# Patient Record
Sex: MALE | Race: WHITE | NOT HISPANIC OR LATINO | Employment: FULL TIME | URBAN - METROPOLITAN AREA
[De-identification: names, ages, dates, MRNs, and addresses within clinical notes are randomized per-mention and may not be internally consistent; named-entity substitution may affect disease eponyms.]

---

## 2017-03-24 ENCOUNTER — TRANSCRIBE ORDERS (OUTPATIENT)
Dept: ADMINISTRATIVE | Facility: HOSPITAL | Age: 42
End: 2017-03-24

## 2017-03-24 DIAGNOSIS — G91.9 HYDROCEPHALUS (HCC): Primary | ICD-10-CM

## 2019-03-05 ENCOUNTER — TRANSCRIBE ORDERS (OUTPATIENT)
Dept: ADMINISTRATIVE | Facility: HOSPITAL | Age: 44
End: 2019-03-05

## 2019-03-05 DIAGNOSIS — M25.562 ACUTE PAIN OF LEFT KNEE: Primary | ICD-10-CM

## 2020-07-31 ENCOUNTER — EVALUATION (OUTPATIENT)
Dept: PHYSICAL THERAPY | Facility: CLINIC | Age: 45
End: 2020-07-31
Payer: COMMERCIAL

## 2020-07-31 ENCOUNTER — TRANSCRIBE ORDERS (OUTPATIENT)
Dept: PHYSICAL THERAPY | Facility: CLINIC | Age: 45
End: 2020-07-31

## 2020-07-31 DIAGNOSIS — R42 DIZZINESS: Primary | ICD-10-CM

## 2020-07-31 PROCEDURE — 97162 PT EVAL MOD COMPLEX 30 MIN: CPT | Performed by: PHYSICAL THERAPIST

## 2020-07-31 RX ORDER — DIPHENOXYLATE HYDROCHLORIDE AND ATROPINE SULFATE 2.5; .025 MG/1; MG/1
1 TABLET ORAL DAILY
COMMUNITY

## 2020-07-31 RX ORDER — PREDNISONE 20 MG/1
TABLET ORAL DAILY
COMMUNITY
End: 2022-04-25 | Stop reason: HOSPADM

## 2020-07-31 NOTE — PROGRESS NOTES
PT Evaluation     Today's date: 2020  Patient name: Manual Goodell  : 1975  MRN: [id number]  Referring provider: No ref  provider found  Dx:   Encounter Diagnosis     ICD-10-CM    1  Dizziness R42                   Assessment  Assessment details: Pt is a 54y o  year old male coming to outpatient PT with a diagnosis of vestibular neuritis onset 20  Pt presents with decreased balance, oculomotor deficits, and overall decreased functional mobility  Pt would benefit from skilled PT services in order to address these deficits and reach maximum level of function  Thank you kindly for the referral!  Impairments: activity intolerance, impaired balance and lacks appropriate home exercise program  Other impairment: oculomotor deficits  Barriers to therapy: High deductible  Understanding of Dx/Px/POC: good   Prognosis: good    Goals  STG's ( 3-4 weeks)  1  Pt will be independent in HEP  2  Improve balance demonstrated by improved score on CT-SIB testing  LTG's ( 6- 8 weeks)  1  Improve FOTO score by 8-10 points  2  Pt will have less dizziness with yard work  3  Pt will have improved tolerance for work activities  4  Pt will return to light recreational activities    Plan  Patient would benefit from: PT eval and skilled physical therapy  Planned therapy interventions: manual therapy, balance, neuromuscular re-education, strengthening, stretching, therapeutic activities, therapeutic exercise, functional ROM exercises, flexibility and home exercise program  Frequency: 1x week  Duration in weeks: 6  Plan of Care beginning date: 2020  Plan of Care expiration date: 2020  Treatment plan discussed with: patient        Subjective Evaluation    History of Present Illness  Date of onset: 2020  Mechanism of injury: Pt reports increased dizziness 20  Pt was sitting at his desk working on his desk at the computer and all of the sudden, the room started spinning   Pt broke out into a cold sweat, nausea, he took some deep breaths and it gradually resolved  Pt continued working the rest of the day  Pt felt a little "off"  Pt rested that evening  Pt got up to grill some hamburgers, but he had to hold onto the counter tops because he felt off balance  Pt went to Urgent Care the next morning ( Saturday)  Pt rested the rest of the day, and went upstairs at night and had extreme nausea and vomited about 4 times ( about every 1 5 hours)  Pt has shunt dependent hydrocephalus, where he could get nausea and vomiting, but usually has pressure  Pt was diagnosed with vestibular neuritis, a viral infection with the vestibular nerve  Pt was placed on Prednisone  Pt has been currently working from home and it has been challenging and feels cloudy and has difficulty focusing on 2 computer monitors  Pt has increased difficulty with concentrating and thinking, looking at 2 computer monitors, to move quickly when standing up to get out of a chair, performing yard work, and running/ exercise  Pt feels like he needs to move slowly, feels fatigued, and feels " wavy"      Work: ; office work managing projects, computer  Hobbies: running, ride bike  Gait: no abnormalities  Pain  At best pain ratin  At worst pain rating: 3  Location: headache  Quality: dull ache    Treatments  Previous treatment: medication  Patient Goals  Patient goal: to return to normal functioning; to build up vestibular nerve function        Objective     Neurological Testing     Sensation   Cervical/Thoracic   Left   Intact: light touch    Right   Intact: light touch    Reflexes   Left   Biceps (C5/C6): normal (2+)  Brachioradialis (C6): normal (2+)  Triceps (C7): normal (2+)    Right   Biceps (C5/C6): normal (2+)  Brachioradialis (C6): normal (2+)  Triceps (C7): normal (2+)    Active Range of Motion   Cervical/Thoracic Spine       Cervical    Flexion:  WFL  Extension:  WFL  Left lateral flexion:  WFL  Right lateral flexion:  WFL  Left rotation: WFL  Right rotation:  [facility]    Additional Active Range of Motion Details  Vestibular Examination:    Primary Complaints: nausea, fatigue, vomitting    Exacerbating Factors: unknown    Relieving Factors: rest    Modified CT SIB testing:  EO on firm surface:    99 sway index  EC on firm surface: 1 30  sway index  EO on foam surface:  70  sway index  EC on foam surface: 2 31  sway index    Spontaneous nystagmus room light: negative  Gaze holding nystagmus room light: negative  Skew deviation room light: negative  Smooth pursuits:  vertical:  30 seconds, fatigue, saccadic   Horizontal: 30 seconds, fatigue, saccadic  Horizontal head thrust test: negative  VOR cancellation: 30 seconds; mild dizziness  Near point convergence: 6 5 cm  ( normal 4- 6 cm)  Good convergence  Oculomotor mobility: good  Sharp Stu test: negative  Vertebral Artery Test: negative  Dennis Hallpike test: negative  Horizontal Roll test: NT    Increased tone with mild TTP B upper trap/levator scap            Dx: dizziness/ vestibular neuritis  EPOC: 9/11/20  CO-MORBIDITIES: hydrocephalus with shunt  PERSONAL FACTORS: drives 1 hour to work  Precautions: none      Manuals             B upper trap MFR                                                    Neuro Re-Ed             Walking with head turns horiz             Walking with head turns vert             VOR x2  horiz             VOR x2 verti             VOR x1 horiz pattern bg             Treadmill: look close, look far             Sidestepping on foam             Tandem walking on foam             4 square hurdles w/ foam             HT on foam EC             Sidestepping with cone taps                                                                              Ther Activity                                       Gait Training                                       Modalities

## 2020-07-31 NOTE — LETTER
2020    [address] [place] [id number]    Patient: Navdeep Terrazas   YOB: 1975   Date of Visit: 2020     Encounter Diagnosis     ICD-10-CM    1  Ceci Bacon        Dear Dr Soumya Bishop:    Thank you for your recent referral of Navdeep Terrazas  Please review the attached evaluation summary from [facility] recent visit  Please verify that you agree with the plan of care by signing the attached order  If you have any questions or concerns, please do not hesitate to call  I sincerely appreciate the opportunity to share in the care of one of your patients and hope to have another opportunity to work with you in the near future  Sincerely,    Zelda Gooden, PT      Referring Provider:      I certify that I have read the below Plan of Care and certify the need for these services furnished under this plan of treatment while under my care  [address] [place] [address] Drive: [phone]          PT Evaluation     Today's date: 2020  Patient name: Navdeep Terrazas  : 1975  MRN: [id number]  Referring provider: No ref  provider found  Dx:   Encounter Diagnosis     ICD-10-CM    1  Dizziness R42                   Assessment  Assessment details: Pt is a 54y o  year old male coming to outpatient PT with a diagnosis of vestibular neuritis onset 20  Pt presents with decreased balance, oculomotor deficits, and overall decreased functional mobility  Pt would benefit from skilled PT services in order to address these deficits and reach maximum level of function   Thank you kindly for the referral!  Impairments: activity intolerance, impaired balance and lacks appropriate home exercise program  Other impairment: oculomotor deficits  Barriers to therapy: High deductible  Understanding of Dx/Px/POC: good   Prognosis: good    Goals  STG's ( 3-4 weeks)  1  Pt will be independent in HEP  2  Improve balance demonstrated by improved score on CT-SIB testing  LTG's ( 6- 8 weeks)  1  Improve FOTO score by 8-10 points  2  Pt will have less dizziness with yard work  3  Pt will have improved tolerance for work activities  4  Pt will return to light recreational activities    Plan  Patient would benefit from: PT eval and skilled physical therapy  Planned therapy interventions: manual therapy, balance, neuromuscular re-education, strengthening, stretching, therapeutic activities, therapeutic exercise, functional ROM exercises, flexibility and home exercise program  Frequency: 1x week  Duration in weeks: 6  Plan of Care beginning date: 7/31/2020  Plan of Care expiration date: 9/11/2020  Treatment plan discussed with: patient        Subjective Evaluation    History of Present Illness  Date of onset: 7/24/2020  Mechanism of injury: Pt reports increased dizziness 7/24/20  Pt was sitting at his desk working on his desk at the computer and all of the sudden, the room started spinning  Pt broke out into a cold sweat, nausea, he took some deep breaths and it gradually resolved  Pt continued working the rest of the day  Pt felt a little "off"  Pt rested that evening  Pt got up to grill some hamburgers, but he had to hold onto the counter tops because he felt off balance  Pt went to Urgent Care the next morning ( Saturday)  Pt rested the rest of the day, and went upstairs at night and had extreme nausea and vomited about 4 times ( about every 1 5 hours)  Pt has shunt dependent hydrocephalus, where he could get nausea and vomiting, but usually has pressure  Pt was diagnosed with vestibular neuritis, a viral infection with the vestibular nerve  Pt was placed on Prednisone  Pt has been currently working from home and it has been challenging and feels cloudy and has difficulty focusing on 2 computer monitors    Pt has increased difficulty with concentrating and thinking, looking at 2 computer monitors, to move quickly when standing up to get out of a chair, performing yard work, and running/ exercise  Pt feels like he needs to move slowly, feels fatigued, and feels " wavy"      Work: ; office work managing projects, computer  Hobbies: running, ride bike  Gait: no abnormalities  Pain  At best pain ratin  At worst pain rating: 3  Location: headache  Quality: dull ache    Treatments  Previous treatment: medication  Patient Goals  Patient goal: to return to normal functioning; to build up vestibular nerve function        Objective     Neurological Testing     Sensation   Cervical/Thoracic   Left   Intact: light touch    Right   Intact: light touch    Reflexes   Left   Biceps (C5/C6): normal (2+)  Brachioradialis (C6): normal (2+)  Triceps (C7): normal (2+)    Right   Biceps (C5/C6): normal (2+)  Brachioradialis (C6): normal (2+)  Triceps (C7): normal (2+)    Active Range of Motion   Cervical/Thoracic Spine       Cervical    Flexion:  WFL  Extension:  WFL  Left lateral flexion:  WFL  Right lateral flexion:  WFL  Left rotation:  WFL  Right rotation:  [facility]    Additional Active Range of Motion Details  Vestibular Examination:    Primary Complaints: nausea, fatigue, vomitting    Exacerbating Factors: unknown    Relieving Factors: rest    Modified CT SIB testing:  EO on firm surface:    99 sway index  EC on firm surface: 1 30  sway index  EO on foam surface:  70  sway index  EC on foam surface: 2 31  sway index    Spontaneous nystagmus room light: negative  Gaze holding nystagmus room light: negative  Skew deviation room light: negative  Smooth pursuits:  vertical:  30 seconds, fatigue, saccadic   Horizontal: 30 seconds, fatigue, saccadic  Horizontal head thrust test: negative  VOR cancellation: 30 seconds; mild dizziness  Near point convergence: 6 5 cm  ( normal 4- 6 cm)  Good convergence  Oculomotor mobility: good  Sharp Stu test: negative  Vertebral Artery Test: negative  Atomic Moguls test: negative  Horizontal Roll test: NT    Increased tone with mild TTP B upper trap/levator scap            Dx: dizziness/ vestibular neuritis  EPOC: 9/11/20  CO-MORBIDITIES: hydrocephalus with shunt  PERSONAL FACTORS: drives 1 hour to work  Precautions: none      Manuals             B upper trap MFR                                                    Neuro Re-Ed             Walking with head turns horiz             Walking with head turns vert             VOR x2  horiz             VOR x2 verti             VOR x1 horiz pattern bg             Treadmill: look close, look far             Sidestepping on foam             Tandem walking on foam             4 square hurdles w/ foam             HT on foam EC             Sidestepping with cone taps                                                                              Ther Activity                                       Gait Training                                       Modalities

## 2020-08-04 ENCOUNTER — APPOINTMENT (OUTPATIENT)
Dept: PHYSICAL THERAPY | Facility: CLINIC | Age: 45
End: 2020-08-04
Payer: COMMERCIAL

## 2020-08-05 ENCOUNTER — APPOINTMENT (OUTPATIENT)
Dept: PHYSICAL THERAPY | Facility: CLINIC | Age: 45
End: 2020-08-05
Payer: COMMERCIAL

## 2020-08-11 ENCOUNTER — OFFICE VISIT (OUTPATIENT)
Dept: PHYSICAL THERAPY | Facility: CLINIC | Age: 45
End: 2020-08-11
Payer: COMMERCIAL

## 2020-08-11 DIAGNOSIS — R42 DIZZINESS: Primary | ICD-10-CM

## 2020-08-11 PROCEDURE — 97112 NEUROMUSCULAR REEDUCATION: CPT | Performed by: PHYSICAL THERAPIST

## 2020-08-11 NOTE — PROGRESS NOTES
Daily Note     Today's date: 2020  Patient name: Marky Santos  : 1975  MRN: [id number]  Referring provider: Arlen Kayser, PA-C  Dx:   Encounter Diagnosis     ICD-10-CM    1  Dizziness  R42                   Subjective: Pt had a relapse last week and he was not able to get out of bed  Pt went for a slow bike ride on  for about 15 minutes  Pt started feeling worse and it progressively worsened  Pt was not able to get out of bed  Pt threw up at home  Pt reports he is starting to recover  Pt is compliant in HEP and the exercises help him feel better  Pt fatigues with work activities and general activities at home  Objective: See treatment diary below      Assessment: Tolerated treatment well  Patient exhibited good technique with therapeutic exercises  Pt was given an updated HEP and discussed exercises to be performed this week and exercise progression  Plan: Continue per plan of care        Dx: dizziness/ vestibular neuritis  EPOC: 20  CO-MORBIDITIES: hydrocephalus with shunt  PERSONAL FACTORS: drives 1 hour to work  Precautions: none      Manuals             B upper trap MFR                                                    Neuro Re-Ed             Walking with head turns horiz x2 laps             Walking with head turns vert x2 laps            VOR x2  horiz 15"x2            VOR x2 verti 15"x2            VOR x1 H & V checkerboard bg 15"x2 ea            Treadmill: look close, look far             Sidestepping on foam 2 laps            Tandem walking on foam 2 laps FW/ BW            4 square hurdles w/ foam x2 ea            HT on foam EC instruct            Sidestepping with cone taps x1 lap w/& w/o foam            Throwing ball to self x2 laps            Diagonal ball pass over shoulder 10 airex B                                                   Ther Activity                                       Gait Training                                       Modalities

## 2020-08-12 ENCOUNTER — APPOINTMENT (OUTPATIENT)
Dept: PHYSICAL THERAPY | Facility: CLINIC | Age: 45
End: 2020-08-12
Payer: COMMERCIAL

## 2020-08-18 ENCOUNTER — OFFICE VISIT (OUTPATIENT)
Dept: PHYSICAL THERAPY | Facility: CLINIC | Age: 45
End: 2020-08-18
Payer: COMMERCIAL

## 2020-08-18 DIAGNOSIS — R42 DIZZINESS: Primary | ICD-10-CM

## 2020-08-18 PROCEDURE — 97112 NEUROMUSCULAR REEDUCATION: CPT | Performed by: PHYSICAL THERAPIST

## 2020-08-18 NOTE — PROGRESS NOTES
Daily Note     Today's date: 2020  Patient name: Manual Goodell  : 1975  MRN: [id number]  Referring provider: Deon Wilson PA-C  Dx:   Encounter Diagnosis     ICD-10-CM    1  Dizziness  R42                   Subjective: Pt felt some fatigue after last visit, but it wasn't too bad  Pt had a good day yesterday while working from home, but felt very tired by 4 pm and it took until 9 pm to feel better  Pt is compliant in HEP and is going for about a 10 min walk every night  Objective: See treatment diary below      Assessment: Tolerated treatment well  Patient tolerated increased oculomotor ex  Spent time discussing how to progress oculomotor ex at home  Plan: Continue per plan of care        Dx: dizziness/ vestibular neuritis  EPOC: 20  CO-MORBIDITIES: hydrocephalus with shunt  PERSONAL FACTORS: drives 1 hour to work  Precautions: none      Manuals            B upper trap MFR                                                    Neuro Re-Ed             Walking with head turns horiz x2 laps  x2 laps diag           Walking with head turns vert x2 laps            VOR x2  horiz 15"x2 30"x1           VOR x2 verti 15"x2 30"x1           VOR x1 H & V checkerboard bg 15"x2 ea 30"x2 ea           Treadmill: look close, look far  6'           Sidestepping on foam w/ high hurdles 2 laps 2 laps           Tandem walking on foam w/ high hurdles 2 laps FW/ BW 2 laps           4 square hurdles w/ foam x2 ea            HT on foam EC & bosu instruct 30" x1 H & V ea           Sidestepping with cone taps x1 lap w/& w/o foam            Throwing ball to self x2 laps            Diagonal ball pass over shoulder 10 airex B            Throw ball w/ 180* turn  x4           Throw ball w/360* turn  x3           Walking w/ VOR x2 H & V  1/2 lap ea           Optokinetic training video  1' sit           Walk with optokinectic training video  x1 large lap                        Gait Training Modalities

## 2020-08-19 ENCOUNTER — APPOINTMENT (OUTPATIENT)
Dept: PHYSICAL THERAPY | Facility: CLINIC | Age: 45
End: 2020-08-19
Payer: COMMERCIAL

## 2020-08-25 ENCOUNTER — OFFICE VISIT (OUTPATIENT)
Dept: PHYSICAL THERAPY | Facility: CLINIC | Age: 45
End: 2020-08-25
Payer: COMMERCIAL

## 2020-08-25 DIAGNOSIS — R42 DIZZINESS: Primary | ICD-10-CM

## 2020-08-25 PROCEDURE — 97110 THERAPEUTIC EXERCISES: CPT | Performed by: PHYSICAL THERAPIST

## 2020-08-25 PROCEDURE — 97112 NEUROMUSCULAR REEDUCATION: CPT | Performed by: PHYSICAL THERAPIST

## 2020-08-25 NOTE — PROGRESS NOTES
Daily Note     Today's date: 2020  Patient name: Cheryle Gaw  : 1975  MRN: [id number]  Referring provider: Varun Stern PA-C  Dx:   Encounter Diagnosis     ICD-10-CM    1  Dizziness  R42                   Subjective: Pt had mild fatigue after last visit  Pt drove to work for 50 minutes into the office and felt good in the morning, but was very fatigued when driving home at the end of the day  Pt took a nap from 5:45 pm-7 pm  Pt has returned to working in the office some days, but is now getting a ride with a neighbor  Objective: See treatment diary below      Assessment: Tolerated treatment well  Patient was challenged by foam/EC ex  Pt is complaint in HEP      Plan: Continue per plan of care   DEE DEE ADAME     Dx: dizziness/ vestibular neuritis  EPOC: 20  CO-MORBIDITIES: hydrocephalus with shunt  PERSONAL FACTORS: drives 1 hour to work  Precautions: none      Manuals           There Ex             HEP review   2'          elliptial   6'                       Neuro Re-Ed             Walking with head turns horiz x2 laps  x2 laps diag x1 lg lap; VOR x1          Walking with head turns vert x2 laps  x1 lg lap VOR x1          VOR x2  horiz 15"x2 30"x1 30"x1 CB          VOR x2 verti 15"x2 30"x1 30"x1 CB          VOR x1 H & V checkerboard bg 15"x2 ea 30"x2 ea           Treadmill: look close, look far  6'           Sidestepping on foam w/ high hurdles 2 laps 2 laps           Tandem walking on foam w/ high hurdles 2 laps FW/ BW 2 laps           4 square hurdles w/ foam x2 ea            HT on foam EC & bosu instruct 30" x1 H & V ea           Sidestepping with cone taps x1 lap w/& w/o foam            Throwing ball to self x2 laps  tandem x2 laps          Diagonal ball pass over shoulder 10 airex B            Throw ball w/ 180* turn  x4 Arc hold ball x10          Throw ball w/360* turn  x3           Walking w/ VOR x2 H & V  1/2 lap ea           Optokinetic training video  1' sit Walk with optokinectic training video  x1 large lap                        Tandem stance EO/EC on airex   30" ea          Walking w/ diag HT   x1 large lap          Tandem stance airex VOR x1 H EC   30"x1          Tandem stance on airex ballx20 throw to therapist

## 2020-08-26 ENCOUNTER — APPOINTMENT (OUTPATIENT)
Dept: PHYSICAL THERAPY | Facility: CLINIC | Age: 45
End: 2020-08-26
Payer: COMMERCIAL

## 2020-09-02 ENCOUNTER — OFFICE VISIT (OUTPATIENT)
Dept: PHYSICAL THERAPY | Facility: CLINIC | Age: 45
End: 2020-09-02
Payer: COMMERCIAL

## 2020-09-02 ENCOUNTER — TRANSCRIBE ORDERS (OUTPATIENT)
Dept: PHYSICAL THERAPY | Facility: CLINIC | Age: 45
End: 2020-09-02

## 2020-09-02 DIAGNOSIS — R42 DIZZINESS: Primary | ICD-10-CM

## 2020-09-02 PROCEDURE — 97164 PT RE-EVAL EST PLAN CARE: CPT | Performed by: PHYSICAL THERAPIST

## 2020-09-02 PROCEDURE — 97112 NEUROMUSCULAR REEDUCATION: CPT | Performed by: PHYSICAL THERAPIST

## 2020-09-02 NOTE — LETTER
2020    [address] [place] [address]    Patient: Alber Castro   YOB: 1975   Date of Visit: 2020     Encounter Diagnosis     ICD-10-CM    1  Valerie Gomez        Dear Dr Austin Turner:    Thank you for your recent referral of Alber Castro  Please review the attached evaluation summary from [facility] recent visit  Please verify that you agree with the plan of care by signing the attached order  If you have any questions or concerns, please do not hesitate to call  I sincerely appreciate the opportunity to share in the care of one of your patients and hope to have another opportunity to work with you in the near future  Sincerely,    Shana Curiel, PT      Referring Provider:      I certify that I have read the below Plan of Care and certify the need for these services furnished under this plan of treatment while under my care  [address]  [place]: [phone]          PT Re-Evaluation     Today's date: 2020  Patient name: Alber Castro  : 1975  MRN: [id number]  Referring provider: Sonam Garibay PA-C  Dx:   Encounter Diagnosis     ICD-10-CM    1  Dizziness  R42                   Assessment  Assessment details: Since starting skilled PT, oculomotor deficits are improving, balance improving with gradual functional progress  Recommend pt continue skilled PT at a decreased frequency of 1 time every 2-3 weeks to monitor pt progress  Pt will progress to a self directed program   Impairments: activity intolerance  Other impairment: oculomotor deficits  Barriers to therapy: High deductible  Understanding of Dx/Px/POC: good   Prognosis: good    Goals  STG's ( 3-4 weeks)  1  Pt will be independent in HEP-met  2   Improve balance demonstrated by improved score on CT-SIB testing-partial met  LTG's ( 6- 8 weeks)  1  Improve FOTO score by 8-10 points-met  2  Pt will have less dizziness with yard work-partial met  3  Pt will have improved tolerance for work activities-met  4  Pt will return to light recreational activities-met    Plan  Patient would benefit from: skilled physical therapy  Planned therapy interventions: manual therapy, balance, neuromuscular re-education, strengthening, stretching, therapeutic activities, therapeutic exercise, functional ROM exercises, flexibility and home exercise program  Frequency: 1 time every 2-3 weeks  Duration in weeks: 8  Plan of Care beginning date: 8/31/2020  Plan of Care expiration date: 10/26/2020  Treatment plan discussed with: patient        Subjective Evaluation    History of Present Illness  Date of onset: 7/24/2020  Mechanism of injury: Pt reports increased dizziness 7/24/20  Pt was sitting at his desk working on his desk at the computer and all of the sudden, the room started spinning  Pt broke out into a cold sweat, nausea, he took some deep breaths and it gradually resolved  Pt continued working the rest of the day  Pt felt a little "off"  Pt rested that evening  Pt got up to grill some hamburgers, but he had to hold onto the counter tops because he felt off balance  Pt went to Urgent Care the next morning ( Saturday)  Pt rested the rest of the day, and went upstairs at night and had extreme nausea and vomited about 4 times ( about every 1 5 hours)  Pt has shunt dependent hydrocephalus, where he could get nausea and vomiting, but usually has pressure  Pt was diagnosed with vestibular neuritis, a viral infection with the vestibular nerve  Pt was placed on Prednisone  Pt has been currently working from home and it has been challenging and feels cloudy and has difficulty focusing on 2 computer monitors    Pt has increased difficulty with concentrating and thinking, looking at 2 computer monitors, to move quickly when standing up to get out of a chair, performing yard work, and running/ exercise  Pt feels like he needs to move slowly, feels fatigued, and feels " wavy"  Pt reports he did more activity on   Pt did yard work, but took a lot of breaks and felt fatigued/ exhausted at night  Yesterday, he drove 1 5 hours to [facility] to and from work  He felt pretty good after working and driving home  Pt still has a feeling of " not totally right"  Pt is able to move better in general and can move his head with less consequences  Pt has been taking walks outside without exacerbation of symptoms  Pt reports a 90% improvement since starting skilled PT  Pt reports HA symptoms come and go  Pt gets a headache concentrating looking at a computer screen all day  Work: ; office work managing projects, computer  Hobbies: running, ride bike  Gait: no abnormalities  Pain  At best pain ratin  At worst pain rating: 3  Location: headache  Quality: dull ache    Treatments  Previous treatment: medication  Patient Goals  Patient goal: to return to normal functioning; to build up vestibular nerve function        Objective     Neurological Testing     Sensation   Cervical/Thoracic   Left   Intact: light touch    Right   Intact: light touch    Reflexes   Left   Biceps (C5/C6): normal (2+)  Brachioradialis (C6): normal (2+)  Triceps (C7): normal (2+)    Right   Biceps (C5/C6): normal (2+)  Brachioradialis (C6): normal (2+)  Triceps (C7): normal (2+)    Active Range of Motion   Cervical/Thoracic Spine       Cervical    Flexion:  WFL  Extension:  WFL  Left lateral flexion:  WFL  Right lateral flexion:  WFL  Left rotation:  WFL  Right rotation:  [facility]    Additional Active Range of Motion Details  Vestibular Examination:    Primary Complaints: nausea, fatigue, vomitting    Exacerbating Factors: unknown    Relieving Factors: rest    I E: Modified CT SIB testin/2/20 Mod CT SIB testing  EO on firm surface:    99 sway index                        91  Sway index EC on firm surface: 1 30  sway index                       1 58 sway index  EO on foam surface:  70  sway index                         88 sway index  EC on foam surface: 2 31  sway index                      2 24 sway index    Spontaneous nystagmus room light: negative  Gaze holding nystagmus room light: negative  Skew deviation room light: negative  Smooth pursuits:  vertical:  30 seconds, fatigue, saccadic   Horizontal: 30 seconds, fatigue, saccadic;   9/2/20: horizontal: 30 seconds; no fatigue; no saccadic mvt good tracking               Vertical: 30 seconds; no fatigue; no saccadic eye mvt; good tracking  Horizontal head thrust test: negative  VOR cancellation: 30 seconds; mild dizziness; 30 seconds  No dizziness  Near point convergence: 6 5 cm; 9/2/20: 6 cm  ( normal 4- 6 cm)  Good convergence  Oculomotor mobility: good  Sharp Stu test: negative  Vertebral Artery Test: negative  [place] Hallpike test: negative  Horizontal Roll test: NT    Increased tone with mild TTP B upper trap/levator scap          Dx: dizziness/ vestibular neuritis  EPOC: 9/11/20  CO-MORBIDITIES: hydrocephalus with shunt  PERSONAL FACTORS: drives 1 hour to work  Precautions: none      Manuals 8/11 8/18 8/25 9/2         There Ex             HEP review   2'          elliptial   6'          Progress note    28'         Neuro Re-Ed             Walking with head turns horiz x2 laps  x2 laps diag x1 lg lap; VOR x1          Walking with head turns vert x2 laps  x1 lg lap VOR x1          VOR x2  horiz 15"x2 30"x1 30"x1 CB          VOR x2 verti 15"x2 30"x1 30"x1 CB          VOR x1 H & V checkerboard bg 15"x2 ea 30"x2 ea           Treadmill: look close, look far  6'           Sidestepping on foam w/ high hurdles 2 laps 2 laps           Tandem walking on foam w/ high hurdles 2 laps FW/ BW 2 laps           4 square hurdles w/ foam x2 ea            HT on foam EC & bosu instruct 30" x1 H & V ea           Sidestepping with cone taps x1 lap w/& w/o foam            Throwing ball to self x2 laps  tandem x2 laps          Diagonal ball pass over shoulder 10 airex B            Throw ball w/ 180* turn  x4 Arc hold ball x10          Throw ball w/360* turn  x3           Walking w/ VOR x2 H & V  1/2 lap ea           Optokinetic training video  1' sit           Walk with optokinectic training video  x1 large lap                        Tandem stance EO/EC on airex   30" ea 30"x2 ea         Walking w/ diag HT   x1 large lap          Tandem stance airex VOR x1 H EC   30"x1 FT EC H& V 30"x2         Tandem stance on airex ballx20 throw to therapist

## 2020-09-02 NOTE — PROGRESS NOTES
PT Re-Evaluation     Today's date: 2020  Patient name: Carissa Husain  : 1975  MRN: [id number]  Referring provider: Bucky Leblanc PA-C  Dx:   Encounter Diagnosis     ICD-10-CM    1  Dizziness  R42                   Assessment  Assessment details: Since starting skilled PT, oculomotor deficits are improving, balance improving with gradual functional progress  Recommend pt continue skilled PT at a decreased frequency of 1 time every 2-3 weeks to monitor pt progress  Pt will progress to a self directed program   Impairments: activity intolerance  Other impairment: oculomotor deficits  Barriers to therapy: High deductible  Understanding of Dx/Px/POC: good   Prognosis: good    Goals  STG's ( 3-4 weeks)  1  Pt will be independent in HEP-met  2  Improve balance demonstrated by improved score on CT-SIB testing-partial met  LTG's ( 6- 8 weeks)  1  Improve FOTO score by 8-10 points-met  2  Pt will have less dizziness with yard work-partial met  3  Pt will have improved tolerance for work activities-met  4  Pt will return to light recreational activities-met    Plan  Patient would benefit from: skilled physical therapy  Planned therapy interventions: manual therapy, balance, neuromuscular re-education, strengthening, stretching, therapeutic activities, therapeutic exercise, functional ROM exercises, flexibility and home exercise program  Frequency: 1 time every 2-3 weeks  Duration in weeks: 8  Plan of Care beginning date: 2020  Plan of Care expiration date: 10/26/2020  Treatment plan discussed with: patient        Subjective Evaluation    History of Present Illness  Date of onset: 2020  Mechanism of injury: Pt reports increased dizziness 20  Pt was sitting at his desk working on his desk at the computer and all of the sudden, the room started spinning  Pt broke out into a cold sweat, nausea, he took some deep breaths and it gradually resolved  Pt continued working the rest of the day   Pt felt a little "off"  Pt rested that evening  Pt got up to grill some hamburgers, but he had to hold onto the counter tops because he felt off balance  Pt went to Urgent Care the next morning ( Saturday)  Pt rested the rest of the day, and went upstairs at night and had extreme nausea and vomited about 4 times ( about every 1 5 hours)  Pt has shunt dependent hydrocephalus, where he could get nausea and vomiting, but usually has pressure  Pt was diagnosed with vestibular neuritis, a viral infection with the vestibular nerve  Pt was placed on Prednisone  Pt has been currently working from home and it has been challenging and feels cloudy and has difficulty focusing on 2 computer monitors  Pt has increased difficulty with concentrating and thinking, looking at 2 computer monitors, to move quickly when standing up to get out of a chair, performing yard work, and running/ exercise  Pt feels like he needs to move slowly, feels fatigued, and feels " wavy"  Pt reports he did more activity on   Pt did yard work, but took a lot of breaks and felt fatigued/ exhausted at night  Yesterday, he drove 1 5 hours to [facility] to and from work  He felt pretty good after working and driving home  Pt still has a feeling of " not totally right"  Pt is able to move better in general and can move his head with less consequences  Pt has been taking walks outside without exacerbation of symptoms  Pt reports a 90% improvement since starting skilled PT  Pt reports HA symptoms come and go  Pt gets a headache concentrating looking at a computer screen all day    Work: ; office work managing projects, computer  Hobbies: running, ride bike  Gait: no abnormalities  Pain  At best pain ratin  At worst pain rating: 3  Location: headache  Quality: dull ache    Treatments  Previous treatment: medication  Patient Goals  Patient goal: to return to normal functioning; to build up vestibular nerve function        Objective     Neurological Testing     Sensation   Cervical/Thoracic   Left   Intact: light touch    Right   Intact: light touch    Reflexes   Left   Biceps (C5/C6): normal (2+)  Brachioradialis (C6): normal (2+)  Triceps (C7): normal (2+)    Right   Biceps (C5/C6): normal (2+)  Brachioradialis (C6): normal (2+)  Triceps (C7): normal (2+)    Active Range of Motion   Cervical/Thoracic Spine       Cervical    Flexion:  WFL  Extension:  WFL  Left lateral flexion:  WFL  Right lateral flexion:  WFL  Left rotation:  WFL  Right rotation:  [facility]    Additional Active Range of Motion Details  Vestibular Examination:    Primary Complaints: nausea, fatigue, vomitting    Exacerbating Factors: unknown    Relieving Factors: rest    I E: Modified CT SIB testin/2/20 Mod CT SIB testing  EO on firm surface:    99 sway index                        91  Sway index                                EC on firm surface: 1 30  sway index                       1 58 sway index  EO on foam surface:  70  sway index                         88 sway index  EC on foam surface: 2 31  sway index                      2 24 sway index    Spontaneous nystagmus room light: negative  Gaze holding nystagmus room light: negative  Skew deviation room light: negative  Smooth pursuits:  vertical:  30 seconds, fatigue, saccadic   Horizontal: 30 seconds, fatigue, saccadic;   20: horizontal: 30 seconds; no fatigue; no saccadic mvt good tracking               Vertical: 30 seconds; no fatigue; no saccadic eye mvt; good tracking  Horizontal head thrust test: negative  VOR cancellation: 30 seconds; mild dizziness; 30 seconds  No dizziness  Near point convergence: 6 5 cm; 20: 6 cm  ( normal 4- 6 cm)  Good convergence  Oculomotor mobility: good  Sharp Stu test: negative  Vertebral Artery Test: negative  [place] Hallpike test: negative  Horizontal Roll test: NT    Increased tone with mild TTP B upper trap/levator scap          Dx: dizziness/ vestibular neuritis  EPOC: 9/11/20  CO-MORBIDITIES: hydrocephalus with shunt  PERSONAL FACTORS: drives 1 hour to work  Precautions: none      Manuals 8/11 8/18 8/25 9/2         There Ex             HEP review   2'          elliptial   6'          Progress note    28'         Neuro Re-Ed             Walking with head turns horiz x2 laps  x2 laps diag x1 lg lap; VOR x1          Walking with head turns vert x2 laps  x1 lg lap VOR x1          VOR x2  horiz 15"x2 30"x1 30"x1 CB          VOR x2 verti 15"x2 30"x1 30"x1 CB          VOR x1 H & V checkerboard bg 15"x2 ea 30"x2 ea           Treadmill: look close, look far  6'           Sidestepping on foam w/ high hurdles 2 laps 2 laps           Tandem walking on foam w/ high hurdles 2 laps FW/ BW 2 laps           4 square hurdles w/ foam x2 ea            HT on foam EC & bosu instruct 30" x1 H & V ea           Sidestepping with cone taps x1 lap w/& w/o foam            Throwing ball to self x2 laps  tandem x2 laps          Diagonal ball pass over shoulder 10 airex B            Throw ball w/ 180* turn  x4 Arc hold ball x10          Throw ball w/360* turn  x3           Walking w/ VOR x2 H & V  1/2 lap ea           Optokinetic training video  1' sit           Walk with optokinectic training video  x1 large lap                        Tandem stance EO/EC on airex   30" ea 30"x2 ea         Walking w/ diag HT   x1 large lap          Tandem stance airex VOR x1 H EC   30"x1 FT EC H& V 30"x2         Tandem stance on airex ballx20 throw to therapist

## 2020-09-23 ENCOUNTER — OFFICE VISIT (OUTPATIENT)
Dept: PHYSICAL THERAPY | Facility: CLINIC | Age: 45
End: 2020-09-23
Payer: COMMERCIAL

## 2020-09-23 DIAGNOSIS — R42 DIZZINESS: Primary | ICD-10-CM

## 2020-09-23 PROCEDURE — 97110 THERAPEUTIC EXERCISES: CPT | Performed by: PHYSICAL THERAPIST

## 2020-09-23 NOTE — PROGRESS NOTES
Daily Note   Addendum 11/10/20: Pt is doing well with functional activities at home  Pt is able to run 3 miles, ride bike at high speeds for 7 miles, swing the golf club, lift/ carry firewood without aggravation of sx  Pt feels slightly off balance after working a full day with concentration and driving home for 1 hour  Pt feels he is gradually getting better  Will d/c pt from skilled PT at this time  Today's date: 2020  Patient name: Adriana Clements  : 1975  MRN: [id number]  Referring provider: Anuj Schaefer PA-C  Dx:   Encounter Diagnosis     ICD-10-CM    1  Dizziness  R42                   Subjective: Pt reports he is doing well and is progressing with his HEP  Pt played tennis/ hit the ball with his daughter and her friend but was not able to play at the same intensity  Pt was able to jog for 1 5 miles at about a 10 minute pace  Monday and Tuesday, pt felt a little more of a regression with feeling a little off balance in his head  Objective: See treatment diary below      Assessment: Tolerated treatment well  Patient was instructed in VOR cancellation  Recommended pt try riding bike casually and swinging a golf club in the next several weeks  Wendy Stanton Spent 15 minutes reviewing pt progress, giving instruction on self management strategies and activities for progression  Emphasized the importance of stress management  Plan: Continue per plan of care  Pt will email therapist and/ or talk on the phone for the next check in       Dx: dizziness/ vestibular neuritis  EPOC: 20  CO-MORBIDITIES: hydrocephalus with shunt  PERSONAL FACTORS: drives 1 hour to work  Precautions: none      Manuals          There Ex             HEP review   2'          elliptial   6'                       Neuro Re-Ed             Walking with head turns horiz x2 laps  x2 laps diag x1 lg lap; VOR x1          Walking with head turns vert x2 laps  x1 lg lap VOR x1          VOR x2  horiz 15"x2 30"x1 30"x1 CB          VOR x2 verti 15"x2 30"x1 30"x1 CB          VOR x1 H & V checkerboard bg 15"x2 ea 30"x2 ea           Treadmill: look close, look far  6'           Sidestepping on foam w/ high hurdles 2 laps 2 laps           Tandem walking on foam w/ high hurdles 2 laps FW/ BW 2 laps           4 square hurdles w/ foam x2 ea            HT on foam EC & bosu instruct 30" x1 H & V ea           Sidestepping with cone taps x1 lap w/& w/o foam            Throwing ball to self x2 laps  tandem x2 laps          Diagonal ball pass over shoulder 10 airex B            Throw ball w/ 180* turn  x4 Arc hold ball x10          Throw ball w/360* turn  x3           Walking w/ VOR x2 H & V  1/2 lap ea           Optokinetic training video  1' sit           Walk with optokinectic training video  x1 large lap           VOR cancellation    30'x1 instruct         Tandem stance EO/EC on airex   30" ea          Walking w/ diag HT   x1 large lap          Tandem stance airex VOR x1 H EC   30"x1          Tandem stance on airex ballx20 throw to therapist

## 2022-03-07 ENCOUNTER — TELEPHONE (OUTPATIENT)
Dept: GASTROENTEROLOGY | Facility: CLINIC | Age: 47
End: 2022-03-07

## 2022-03-07 NOTE — TELEPHONE ENCOUNTER
03/07/22  Screened by: Ana Lizama    Referring Provider selina  Pre- Screening: There is no height or weight on file to calculate BMI  Has patient been referred for a routine screening Colonoscopy? yes  Is the patient between 39-70 years old? yes      Previous Colonoscopy no   If yes:    Date: na    Facility: na    Reason: na      SCHEDULING STAFF: If the patient is between 45yrs-49yrs, please advise patient to confirm benefits/coverage with their insurance company for a routine screening colonoscopy, some insurance carriers will only cover at [phone] or older  If the patient is over 66years old, please schedule an office visit  Does the patient want to see a Gastroenterologist prior to their procedure OR are they having any GI symptoms? no    Has the patient been hospitalized or had abdominal surgery in the past 6 months? no    Does the patient use supplemental oxygen? no    Does the patient take Coumadin, Lovenox, Plavix, Elliquis, Xarelto, or other blood thinning medication? no    Has the patient had a stroke, cardiac event, or stent placed in the past year? no    SCHEDULING STAFF: If patient answers NO to above questions, then schedule procedure  If patient answers YES to above questions, then schedule office appointment  If patient is between 45yrs - 49yrs, please advise patient that we will have to confirm benefits & coverage with their insurance company for a routine screening colonoscopy

## 2022-04-15 DIAGNOSIS — Z12.11 ENCOUNTER FOR SCREENING COLONOSCOPY: Primary | ICD-10-CM

## 2022-04-15 RX ORDER — SODIUM PICOSULFATE, MAGNESIUM OXIDE, AND ANHYDROUS CITRIC ACID 10; 3.5; 12 MG/160ML; G/160ML; G/160ML
LIQUID ORAL
Qty: 320 ML | Refills: 0 | Status: SHIPPED | OUTPATIENT
Start: 2022-04-15 | End: 2022-04-25 | Stop reason: HOSPADM

## 2022-04-15 NOTE — TELEPHONE ENCOUNTER
Scheduled date of colonoscopy (as of today):4/25/22  Physician performing colonoscopy:Dr Ana Smith  Location of colonoscopy:[facility]  Bowel prep reviewed with patient:Clenpiq-Pt requested, his wife had it recently and he only wanted that prep  Instructions reviewed with patient by: Daxa Castro  Clearances: No

## 2022-04-25 ENCOUNTER — ANESTHESIA (OUTPATIENT)
Dept: GASTROENTEROLOGY | Facility: AMBULATORY SURGERY CENTER | Age: 47
End: 2022-04-25

## 2022-04-25 ENCOUNTER — HOSPITAL ENCOUNTER (OUTPATIENT)
Dept: GASTROENTEROLOGY | Facility: AMBULATORY SURGERY CENTER | Age: 47
Discharge: HOME | End: 2022-04-25
Attending: INTERNAL MEDICINE
Payer: COMMERCIAL

## 2022-04-25 ENCOUNTER — ANESTHESIA EVENT (OUTPATIENT)
Dept: GASTROENTEROLOGY | Facility: AMBULATORY SURGERY CENTER | Age: 47
End: 2022-04-25

## 2022-04-25 VITALS
BODY MASS INDEX: 21.79 KG/M2 | HEART RATE: 63 BPM | DIASTOLIC BLOOD PRESSURE: 86 MMHG | TEMPERATURE: 98 F | SYSTOLIC BLOOD PRESSURE: 132 MMHG | WEIGHT: 169.75 LBS | OXYGEN SATURATION: 100 % | HEIGHT: 74 IN | RESPIRATION RATE: 17 BRPM

## 2022-04-25 DIAGNOSIS — Z12.11 SCREENING FOR COLON CANCER: ICD-10-CM

## 2022-04-25 PROCEDURE — G0105 COLORECTAL SCRN; HI RISK IND: HCPCS | Performed by: INTERNAL MEDICINE

## 2022-04-25 RX ORDER — SODIUM CHLORIDE, SODIUM LACTATE, POTASSIUM CHLORIDE, CALCIUM CHLORIDE 600; 310; 30; 20 MG/100ML; MG/100ML; MG/100ML; MG/100ML
INJECTION, SOLUTION INTRAVENOUS CONTINUOUS PRN
Status: DISCONTINUED | OUTPATIENT
Start: 2022-04-25 | End: 2022-04-25

## 2022-04-25 RX ORDER — SODIUM CHLORIDE, SODIUM LACTATE, POTASSIUM CHLORIDE, CALCIUM CHLORIDE 600; 310; 30; 20 MG/100ML; MG/100ML; MG/100ML; MG/100ML
20 INJECTION, SOLUTION INTRAVENOUS CONTINUOUS
Status: DISCONTINUED | OUTPATIENT
Start: 2022-04-25 | End: 2022-04-29 | Stop reason: HOSPADM

## 2022-04-25 RX ORDER — PROPOFOL 10 MG/ML
INJECTION, EMULSION INTRAVENOUS CONTINUOUS PRN
Status: DISCONTINUED | OUTPATIENT
Start: 2022-04-25 | End: 2022-04-25

## 2022-04-25 RX ORDER — PROPOFOL 10 MG/ML
INJECTION, EMULSION INTRAVENOUS AS NEEDED
Status: DISCONTINUED | OUTPATIENT
Start: 2022-04-25 | End: 2022-04-25

## 2022-04-25 RX ORDER — SODIUM CHLORIDE, SODIUM LACTATE, POTASSIUM CHLORIDE, CALCIUM CHLORIDE 600; 310; 30; 20 MG/100ML; MG/100ML; MG/100ML; MG/100ML
75 INJECTION, SOLUTION INTRAVENOUS CONTINUOUS
Status: DISCONTINUED | OUTPATIENT
Start: 2022-04-25 | End: 2022-04-29 | Stop reason: HOSPADM

## 2022-04-25 RX ADMIN — SODIUM CHLORIDE, SODIUM LACTATE, POTASSIUM CHLORIDE, CALCIUM CHLORIDE: 600; 310; 30; 20 INJECTION, SOLUTION INTRAVENOUS at 07:19

## 2022-04-25 RX ADMIN — PROPOFOL 50 MG: 10 INJECTION, EMULSION INTRAVENOUS at 07:33

## 2022-04-25 RX ADMIN — SODIUM CHLORIDE, SODIUM LACTATE, POTASSIUM CHLORIDE, CALCIUM CHLORIDE 75 ML/HR: 600; 310; 30; 20 INJECTION, SOLUTION INTRAVENOUS at 07:20

## 2022-04-25 RX ADMIN — PROPOFOL 50 MG: 10 INJECTION, EMULSION INTRAVENOUS at 07:38

## 2022-04-25 RX ADMIN — PROPOFOL 50 MG: 10 INJECTION, EMULSION INTRAVENOUS at 07:35

## 2022-04-25 RX ADMIN — PROPOFOL 50 MG: 10 INJECTION, EMULSION INTRAVENOUS at 07:41

## 2022-04-25 RX ADMIN — PROPOFOL 50 MG: 10 INJECTION, EMULSION INTRAVENOUS at 07:44

## 2022-04-25 RX ADMIN — PROPOFOL 150 MG: 10 INJECTION, EMULSION INTRAVENOUS at 07:31

## 2022-04-25 RX ADMIN — PROPOFOL 50 MG: 10 INJECTION, EMULSION INTRAVENOUS at 07:39

## 2022-04-25 NOTE — DISCHARGE INSTRUCTIONS
Colonoscopy   WHAT YOU NEED TO KNOW:   A colonoscopy is a procedure to examine the inside of your colon (intestine) with a scope  Polyps or tissue growths may have been removed during your colonoscopy  It is normal to feel bloated and to have some abdominal discomfort  You should be passing gas  If you have hemorrhoids or you had polyps removed, you may have a small amount of bleeding  DISCHARGE INSTRUCTIONS:   Seek care immediately if:    You have sudden, severe abdominal pain   You have problems swallowing   You have a large amount of black, sticky bowel movements or blood in your bowel movements   You have sudden trouble breathing   You feel weak, lightheaded, or faint or your heart beats faster than normal for you  Contact your healthcare provider if:    You have a fever and chills   You have nausea or are vomiting   Your abdomen is bloated or feels full and hard   You have abdominal pain   You have black, sticky bowel movements or blood in your bowel movements   You have not had a bowel movement for 3 days after your procedure   You have rash or hives   You have questions or concerns about your procedure  Activity:    Do not lift, strain, or run for 24 hours after your procedure   Rest after your procedure  You have been given medicine to relax you  Do not drive or make important decisions until the day after your procedure  Return to your normal activity as directed   Relieve gas and discomfort from bloating by lying on your right side with a heating pad on your abdomen  You may need to take short walks to help the gas move out  Eat small meals until bloating is relieved  Follow up with your healthcare provider as directed: Write down your questions so you remember to ask them during your visits  If you take a blood thinner, please review the specific instructions from your endoscopist about when you should resume it   These can be found in the Recommendation and Your Medication list sections of this After Visit Summary  Hemorrhoids   WHAT YOU NEED TO KNOW:   What are hemorrhoids? Hemorrhoids are swollen blood vessels inside your rectum (internal hemorrhoids) or on your anus (external hemorrhoids)  Sometimes a hemorrhoid may prolapse  This means it extends out of your anus  What increases my risk for hemorrhoids? · Pregnancy or obesity    · Straining or sitting for a long time during bowel movements    · Liver disease    · Weak muscles around the anus caused by older age, rectal surgery, or anal intercourse    · A lack of physical activity    · Chronic diarrhea or constipation    · A low-fiber diet    What are the signs and symptoms of hemorrhoids? · Pain or itching around your anus or inside your rectum    · Swelling or bumps around your anus    · Bright red blood in your bowel movement, on the toilet paper, or in the toilet bowl    · Tissue bulging out of your anus (prolapsed hemorrhoids)    · Incontinence (poor control over urine or bowel movements)    How are hemorrhoids diagnosed? Your healthcare provider will ask about your symptoms, the foods you eat, and your bowel movements  He or she will examine your anus for external hemorrhoids  You may need the following:  · A digital rectal exam  is a test to check for hemorrhoids  Your healthcare provider will put a gloved finger inside your anus to feel for the hemorrhoids  · An anoscopy  is a test that uses a scope (small tube with a light and camera on the end) to look at your hemorrhoids  How are hemorrhoids treated? Treatment will depend on your symptoms  You may need any of the following:  · Medicines  can help decrease pain and swelling, and soften your bowel movement  The medicine may be a pill, pad, cream, or ointment  · Procedures  may be used to shrink or remove your hemorrhoid  Examples include rubber-band ligation, sclerotherapy, and photocoagulation  These procedures may be done in your healthcare provider's office  Ask your healthcare provider for more information about these procedures  · Surgery  may be needed to shrink or remove your hemorrhoids  How can I manage my symptoms? · Apply ice on your anus for 15 to 20 minutes every hour or as directed  Use an ice pack, or put crushed ice in a plastic bag  Cover it with a towel before you apply it to your anus  Ice helps prevent tissue damage and decreases swelling and pain  · Take a sitz bath  Fill a bathtub with 4 to 6 inches of warm water  You may also use a sitz bath pan that fits inside a toilet bowl  Sit in the sitz bath for 15 minutes  Do this 3 times a day, and after each bowel movement  The warm water can help decrease pain and swelling  · Keep your anal area clean  Gently wash the area with warm water daily  Soap may irritate the area  After a bowel movement, wipe with moist towelettes or wet toilet paper  Dry toilet paper can irritate the area  How can I help prevent hemorrhoids? · Do not strain to have a bowel movement  Do not sit on the toilet too long  These actions can increase pressure on the tissues in your rectum and anus  · Drink plenty of liquids  Liquids can help prevent constipation  Ask how much liquid to drink each day and which liquids are best for you  · Eat a variety of high-fiber foods  Examples include fruits, vegetables, and whole grains  Ask your healthcare provider how much fiber you need each day  You may need to take a fiber supplement  · Exercise as directed  Exercise, such as walking, may make it easier to have a bowel movement  Ask your healthcare provider to help you create an exercise plan  · Do not have anal sex  Anal sex can weaken the skin around your rectum and anus  · Avoid heavy lifting  This can cause straining and increase your risk for another hemorrhoid  When should I seek immediate care?    · You have severe pain in your rectum or around your anus  · You have severe pain in your abdomen and you are vomiting  · You have bleeding from your anus that soaks through your underwear  When should I contact my healthcare provider? · You have frequent and painful bowel movements  · Your hemorrhoid looks or feels more swollen than usual      · You do not have a bowel movement for 2 days or more  · You see or feel tissue coming through your anus  · You have questions or concerns about your condition or care  CARE AGREEMENT:   You have the right to help plan your care  Learn about your health condition and how it may be treated  Discuss treatment options with your healthcare providers to decide what care you want to receive  You always have the right to refuse treatment  The above information is an  only  It is not intended as medical advice for individual conditions or treatments  Talk to your doctor, nurse or pharmacist before following any medical regimen to see if it is safe and effective for you  © Copyright [employer] 2022 Information is for End User's use only and may not be sold, redistributed or otherwise used for commercial purposes   All illustrations and images included in CareNotes® are the copyrighted property of [employer]  or [address] [place] [address] [employer]

## 2022-04-25 NOTE — ANESTHESIA POSTPROCEDURE EVALUATION
Post-Op Assessment Note    CV Status:  Stable  Pain Score: 0    Pain management: adequate     Mental Status:  Sleepy   Hydration Status:  Stable   PONV Controlled:  None   Airway Patency:  Patent      Post Op Vitals Reviewed: Yes      Staff: CRNA         No complications documented      BP      Temp      Pulse 68 (04/25/22 0757)   Resp 20 (04/25/22 0757)    SpO2 94 % (04/25/22 0757)

## 2022-04-25 NOTE — H&P
History and Physical - [facility]    Edgardo Juarez 51 y o  male MRN: [id number]      HPI: Edgardo Juarez is a 51y o  year old male who presents for colon cancer screening  REVIEW OF SYSTEMS: Per the HPI, and otherwise unremarkable  Historical Information     Past Medical History:   Diagnosis Date    Hydrocephalus ([address] )     has shunt implant- right side     Past Surgical History:   Procedure Laterality Date    BRAIN SURGERY      shunt for hydrocephalus     Social History   Social History     Substance and Sexual Activity   Alcohol Use Yes    Comment: social     Social History     Substance and Sexual Activity   Drug Use Never     Social History     Tobacco Use   Smoking Status Never Smoker   Smokeless Tobacco Never Used     No family history on file  Meds/Allergies       Current Outpatient Medications:     multivitamin (THERAGRAN) TABS    Sod Picosulfate-Mag Ox-Cit Acd (Clenpiq) 10-3 5-12 MG-GM -GM/160ML SOLN    predniSONE 20 mg tablet    No Known Allergies    Objective     /83   Pulse (!) 50   Temp 98 °F (36 7 °C) (Temporal)   Resp 14   Ht 6' 2" (1 88 m)   Wt 77 kg (169 lb 12 1 oz)   SpO2 100%   BMI 21 80 kg/m²       PHYSICAL EXAM    Gen: NAD AAOx3  Head: Normocephalic, Atraumatic  CV: S1S2 RRR no m/r/g  CHEST: Clear b/l no c/r/w  ABD: soft, +BS NT/ND no masses  EXT: no edema      ASSESSMENT/PLAN:  This is a 51y o  year old male here for colonoscopy, and he is stable and optimized for his procedure

## 2022-04-25 NOTE — ANESTHESIA PREPROCEDURE EVALUATION
Procedure:  COLONOSCOPY    Relevant Problems   No relevant active problems    Hx hydrocephalus,s/p  V/P shunt    Physical Exam    Airway    Mallampati score: II  TM Distance: >3 FB  Neck ROM: full     Dental   No notable dental hx     Cardiovascular  Cardiovascular exam normal    Pulmonary  Pulmonary exam normal     Other Findings        Anesthesia Plan  ASA Score- 2     Anesthesia Type- IV sedation with anesthesia with ASA Monitors  Additional Monitors:   Airway Plan:           Plan Factors-Exercise tolerance (METS): >4 METS  Chart reviewed  Patient summary reviewed  Patient is not a current smoker  Induction- intravenous  Postoperative Plan-     Informed Consent- Anesthetic plan and risks discussed with patient  I personally reviewed this patient with the CRNA  Discussed and agreed on the Anesthesia Plan with the CRNA  Wilman Valle